# Patient Record
Sex: FEMALE | Race: WHITE | NOT HISPANIC OR LATINO | Employment: UNEMPLOYED | URBAN - METROPOLITAN AREA
[De-identification: names, ages, dates, MRNs, and addresses within clinical notes are randomized per-mention and may not be internally consistent; named-entity substitution may affect disease eponyms.]

---

## 2023-11-02 ENCOUNTER — OFFICE VISIT (OUTPATIENT)
Dept: URGENT CARE | Facility: CLINIC | Age: 33
End: 2023-11-02

## 2023-11-02 VITALS — HEART RATE: 71 BPM | TEMPERATURE: 98.3 F | WEIGHT: 225 LBS | OXYGEN SATURATION: 98 % | RESPIRATION RATE: 12 BRPM

## 2023-11-02 DIAGNOSIS — M26.621 TMJ TENDERNESS, RIGHT: Primary | ICD-10-CM

## 2023-11-02 RX ORDER — NAPROXEN 500 MG/1
500 TABLET ORAL 2 TIMES DAILY WITH MEALS
Qty: 10 TABLET | Refills: 0 | Status: SHIPPED | OUTPATIENT
Start: 2023-11-02 | End: 2023-11-07

## 2023-11-02 NOTE — PROGRESS NOTES
[facility]        NAME: Franco Jean is a 26 y.o. female  : 1990    MRN: [id number]  DATE: 2023  TIME: 2:43 PM    Assessment and Plan   TMJ tenderness, right [M26.621]  1. TMJ tenderness, right  naproxen (Naprosyn) 500 mg tablet        Advised no other NSAIDs while on naprosyn. Fu dentist if no improvement or any worsening. Discussed strict return to care precautions as well as red flag symptoms which should prompt immediate ED referral. Pt verbalized understanding and is in agreement with plan. Please follow up with your primary care provider within the next week. Please remember that your visit today was with an urgent care provider and should not replace follow up with your primary care provider for chronic medical issues or annual physicals. Patient Instructions       Follow up with PCP in 3-5 days. Proceed to  ER if symptoms worsen. Chief Complaint     Chief Complaint   Patient presents with    Earache     Pt presents with right ear pain/throbbing that started          History of Present Illness       Pt is a 25 yo female with no reported pmh pw R ear/jaw pain x 4 days. Feels like "throbbing". No otc meds tried. No recent URI symptoms. No dental pain, has not seen a dentist in a few years. Thinks she grinds her teeth while she sleeps. Review of Systems   Review of Systems   Constitutional:  Negative for chills, diaphoresis, fatigue and fever. HENT:  Positive for ear pain. Negative for congestion, postnasal drip, rhinorrhea, sinus pain, sneezing, sore throat and trouble swallowing. Eyes:  Negative for pain and redness. Respiratory:  Negative for cough, chest tightness, shortness of breath and wheezing. Cardiovascular:  Negative for chest pain and leg swelling. Gastrointestinal:  Negative for diarrhea, nausea and vomiting. Musculoskeletal:  Negative for myalgias. Neurological:  Negative for dizziness, weakness and headaches. Current Medications       Current Outpatient Medications:     naproxen (Naprosyn) 500 mg tablet, Take 1 tablet (500 mg total) by mouth 2 (two) times a day with meals for 5 days, Disp: 10 tablet, Rfl: 0    Current Allergies     Allergies as of 11/02/2023    (No Known Allergies)            The following portions of the patient's history were reviewed and updated as appropriate: allergies, current medications, past family history, past medical history, past social history, past surgical history and problem list.     History reviewed. No pertinent past medical history. History reviewed. No pertinent surgical history. History reviewed. No pertinent family history. Medications have been verified. Objective   Pulse 71   Temp 98.3 °F (36.8 °C)   Resp 12   Wt 102 kg (225 lb)   LMP 10/15/2023 (Exact Date)   SpO2 98%        Physical Exam     Physical Exam  Vitals and nursing note reviewed. Constitutional:       General: She is not in acute distress. Appearance: Normal appearance. She is not ill-appearing. HENT:      Head: Normocephalic and atraumatic. Jaw: Tenderness (R TMJ) and pain on movement present. No trismus or malocclusion. Salivary Glands: Right salivary gland is not tender. Left salivary gland is not tender. Right Ear: Tympanic membrane, ear canal and external ear normal.      Left Ear: Tympanic membrane, ear canal and external ear normal.      Mouth/Throat:      Mouth: Mucous membranes are moist.      Dentition: Normal dentition (teeth #30-32 are absent, otherwise dentition appears WNL). No dental tenderness, gingival swelling, dental caries or dental abscesses. Pharynx: Oropharynx is clear. Uvula midline. Tonsils: No tonsillar exudate. Cardiovascular:      Rate and Rhythm: Normal rate. Pulmonary:      Effort: Pulmonary effort is normal. No respiratory distress. Skin:     General: Skin is warm and dry.       Capillary Refill: Capillary refill takes less than 2 seconds. Neurological:      Mental Status: She is alert and oriented to person, place, and time.    Psychiatric:         Behavior: Behavior normal.